# Patient Record
Sex: MALE | Race: ASIAN | NOT HISPANIC OR LATINO
[De-identification: names, ages, dates, MRNs, and addresses within clinical notes are randomized per-mention and may not be internally consistent; named-entity substitution may affect disease eponyms.]

---

## 2024-02-21 PROBLEM — Z86.39 HISTORY OF HYPERLIPIDEMIA: Status: RESOLVED | Noted: 2024-02-21 | Resolved: 2024-02-21

## 2024-02-21 PROBLEM — Z87.891 FORMER SMOKER: Status: ACTIVE | Noted: 2024-02-21

## 2024-02-21 PROBLEM — Z00.00 ENCOUNTER FOR PREVENTIVE HEALTH EXAMINATION: Status: ACTIVE | Noted: 2024-02-21

## 2024-02-21 PROBLEM — I71.20 ANEURYSM, AORTA, THORACIC: Status: RESOLVED | Noted: 2024-02-21 | Resolved: 2024-02-21

## 2024-02-21 PROBLEM — Z86.79 HISTORY OF HYPERTENSION: Status: RESOLVED | Noted: 2024-02-21 | Resolved: 2024-02-21

## 2024-02-21 RX ORDER — ASPIRIN ENTERIC COATED TABLETS 81 MG 81 MG/1
81 TABLET, DELAYED RELEASE ORAL
Refills: 0 | Status: ACTIVE | COMMUNITY

## 2024-02-21 RX ORDER — PANTOPRAZOLE 40 MG/1
40 TABLET, DELAYED RELEASE ORAL
Refills: 0 | Status: ACTIVE | COMMUNITY

## 2024-02-21 RX ORDER — SENNOSIDES 8.6 MG/1
8.6 TABLET, COATED ORAL
Refills: 0 | Status: ACTIVE | COMMUNITY

## 2024-02-21 RX ORDER — ROSUVASTATIN CALCIUM 40 MG/1
40 TABLET, FILM COATED ORAL
Refills: 0 | Status: ACTIVE | COMMUNITY

## 2024-02-21 RX ORDER — METOPROLOL SUCCINATE 25 MG/1
25 TABLET, EXTENDED RELEASE ORAL
Refills: 0 | Status: ACTIVE | COMMUNITY

## 2024-02-21 RX ORDER — OXYCODONE 5 MG/1
5 TABLET ORAL
Refills: 0 | Status: ACTIVE | COMMUNITY

## 2024-02-21 RX ORDER — AMLODIPINE BESYLATE 10 MG/1
10 TABLET ORAL
Refills: 0 | Status: ACTIVE | COMMUNITY

## 2024-02-23 ENCOUNTER — APPOINTMENT (OUTPATIENT)
Dept: THORACIC SURGERY | Facility: CLINIC | Age: 69
End: 2024-02-23
Payer: MEDICARE

## 2024-02-23 DIAGNOSIS — Z86.39 PERSONAL HISTORY OF OTHER ENDOCRINE, NUTRITIONAL AND METABOLIC DISEASE: ICD-10-CM

## 2024-02-23 DIAGNOSIS — I71.20 THORACIC AORTIC ANEURYSM, WITHOUT RUPTURE, UNSPECIFIED: ICD-10-CM

## 2024-02-23 DIAGNOSIS — Z87.891 PERSONAL HISTORY OF NICOTINE DEPENDENCE: ICD-10-CM

## 2024-02-23 DIAGNOSIS — Z86.79 PERSONAL HISTORY OF OTHER DISEASES OF THE CIRCULATORY SYSTEM: ICD-10-CM

## 2024-02-23 PROCEDURE — 99204 OFFICE O/P NEW MOD 45 MIN: CPT

## 2024-02-23 NOTE — PHYSICAL EXAM
[General Appearance - Alert] : alert [Fully active, able to carry on all pre-disease performance without restriction] : Status 0 - Fully active, able to carry on all pre-disease performance without restriction [General Appearance - In No Acute Distress] : in no acute distress [] : no respiratory distress [Auscultation Breath Sounds / Voice Sounds] : lungs were clear to auscultation bilaterally [Chest Visual Inspection Thoracic Asymmetry] : no chest asymmetry [Examination Of The Chest] : the chest was normal in appearance [Diminished Respiratory Excursion] : normal chest expansion [Nail Clubbing] : no clubbing  or cyanosis of the fingernails [Abnormal Walk] : normal gait [Musculoskeletal - Swelling] : no joint swelling seen [Motor Tone] : muscle strength and tone were normal [Oriented To Time, Place, And Person] : oriented to person, place, and time [Impaired Insight] : insight and judgment were intact [Affect] : the affect was normal

## 2024-02-26 VITALS
OXYGEN SATURATION: 96 % | DIASTOLIC BLOOD PRESSURE: 60 MMHG | BODY MASS INDEX: 24.86 KG/M2 | HEIGHT: 68 IN | RESPIRATION RATE: 16 BRPM | HEART RATE: 73 BPM | TEMPERATURE: 97.5 F | WEIGHT: 164 LBS | SYSTOLIC BLOOD PRESSURE: 112 MMHG

## 2024-02-26 NOTE — SURGICAL HISTORY
[TextEntry] : Repair of ruptured thoracic aorta and AVR 10/30/2020 Thoracoabdominal aortic aneurysm repair on 07/18/2023, due to chronic thoracoabdominal aortic aneurysm

## 2024-02-26 NOTE — DATA REVIEWED
[FreeTextEntry1] : CT CAP on 04/15/2023: -Right upper lobe: 0.2 cm nodule medially is stable.  -Left lower lobe: Traction bronchiectasis with associated reticular opacities posterior medially persist. Scant opacities adherent to the walls of the trachea may represent focal mucous retention. -No mediastinal or hilar adenopathy, endobronchial lesions, or pleural effusions are noted. Sub-centimeter lymph nodes in the mediastinum and radha are stable.  CT CAP on 08/16/2022:  -There is no focal pulmonary consolidation. No suspicious pulmonary nodule or mass is identified.

## 2024-02-26 NOTE — HISTORY OF PRESENT ILLNESS
[FreeTextEntry1] :  ROB ORR is a 69-year-old M, former smoker (smoked for 35-40 years, 1PPD, quit in 2023) with PMHx of HLD, ruptured thoracic aortic aneurysm, HTN, chronic Afib, ischemia and infarction of kidney, who is referred by Dr. Je Dowell for an initial evaluation of right lung base nodule, which was found during the workup for thoracic aorta aneurysm.   CT CAP on 02/26/24, revealed a new 0.9 cm right lung base nodule.

## 2024-02-26 NOTE — SOCIAL HISTORY
[TextEntry] : COVID history: vaccinated.  TB history: denied.  Denies major psychiatric history. Occupation: Retired-  Patient lives with family.

## 2024-02-26 NOTE — CONSULT LETTER
[FreeTextEntry3] : Mike Hooker MD Professor, Cardiovascular & Thoracic Surgery [employer] [employer] Director of the [facility]  Director of Thoracic Surgery, [address] [place] [id number] Phone: [phone] Fax: [phone]

## 2024-02-26 NOTE — ASSESSMENT
[FreeTextEntry1] : ROB ORR is a 69-year-old M, former smoker, with PMHx of HLD, ruptured thoracic aortic aneurysm, HTN, chronic Afib, who is referred by Dr. Je Dowell for an initial evaluation of right lung base nodule, which was found during the workup for thoracic aorta aneurysm.   Patient reports he feels well in general. Admits to shortness of breath on exertion, Reports no chest pain, hoarseness, sputum production, fever, chills, unintentional weight loss, or hemoptysis.   I have independently reviewed the medical records and imaging at the time of this office consultation, CT CAP on 02/26/24, revealed a new 0.9 cm right lung base nodule. Explained to the patient that based on the imaging, the etiology is unclear. We will need a full imaging of chest for better evaluation. Will plan for a CT chest no contrast as well as PFT's to measuring underlying lung functioning. Patient agreed with the plan.  Plan: 1.CT chest no contrast. 2.PFTs 3. Follow up after testing.  IKiah RN, am scribing for and the presence of Dr. ASHLEY AVALOS the following sections: history of present illness, past medical/family/surgical/family/social history, review of systems, vital signs, physical exam, and disposition.  ASHLEY MONTANEZ, personally performed the services described in the documentation, reviewed the documentation recorded by the scribe in my presence and it accurately and completely records my words and actions.

## 2024-03-05 ENCOUNTER — OUTPATIENT (OUTPATIENT)
Dept: OUTPATIENT SERVICES | Facility: HOSPITAL | Age: 69
LOS: 1 days | End: 2024-03-05
Payer: MEDICARE

## 2024-03-05 DIAGNOSIS — R91.1 SOLITARY PULMONARY NODULE: ICD-10-CM

## 2024-03-05 PROCEDURE — 94729 DIFFUSING CAPACITY: CPT

## 2024-03-05 PROCEDURE — 94010 BREATHING CAPACITY TEST: CPT | Mod: 26

## 2024-03-05 PROCEDURE — 94726 PLETHYSMOGRAPHY LUNG VOLUMES: CPT | Mod: 26

## 2024-03-05 PROCEDURE — 94760 N-INVAS EAR/PLS OXIMETRY 1: CPT

## 2024-03-05 PROCEDURE — 94060 EVALUATION OF WHEEZING: CPT

## 2024-03-05 PROCEDURE — 94729 DIFFUSING CAPACITY: CPT | Mod: 26

## 2024-03-05 PROCEDURE — 94726 PLETHYSMOGRAPHY LUNG VOLUMES: CPT

## 2024-03-05 RX ORDER — ALBUTEROL 90 UG/1
2 AEROSOL, METERED ORAL ONCE
Refills: 0 | Status: DISCONTINUED | OUTPATIENT
Start: 2024-03-05 | End: 2024-03-19

## 2024-03-08 ENCOUNTER — APPOINTMENT (OUTPATIENT)
Dept: THORACIC SURGERY | Facility: CLINIC | Age: 69
End: 2024-03-08
Payer: MEDICARE

## 2024-03-08 VITALS
TEMPERATURE: 96.7 F | OXYGEN SATURATION: 97 % | HEART RATE: 72 BPM | RESPIRATION RATE: 17 BRPM | SYSTOLIC BLOOD PRESSURE: 113 MMHG | BODY MASS INDEX: 21.67 KG/M2 | DIASTOLIC BLOOD PRESSURE: 55 MMHG | HEIGHT: 68 IN | WEIGHT: 143 LBS

## 2024-03-08 DIAGNOSIS — R93.89 ABNORMAL FINDINGS ON DIAGNOSTIC IMAGING OF OTHER SPECIFIED BODY STRUCTURES: ICD-10-CM

## 2024-03-08 DIAGNOSIS — N28.0 ISCHEMIA AND INFARCTION OF KIDNEY: ICD-10-CM

## 2024-03-08 DIAGNOSIS — Z86.79 PERSONAL HISTORY OF OTHER DISEASES OF THE CIRCULATORY SYSTEM: ICD-10-CM

## 2024-03-08 DIAGNOSIS — R91.1 SOLITARY PULMONARY NODULE: ICD-10-CM

## 2024-03-08 PROCEDURE — 99213 OFFICE O/P EST LOW 20 MIN: CPT

## 2024-03-11 PROBLEM — Z86.79 HISTORY OF ATRIAL FIBRILLATION: Status: RESOLVED | Noted: 2024-03-11 | Resolved: 2024-03-11

## 2024-03-11 PROBLEM — R93.89 ABNORMAL CHEST CT: Status: ACTIVE | Noted: 2024-03-11

## 2024-03-11 PROBLEM — N28.0: Status: RESOLVED | Noted: 2024-03-11 | Resolved: 2024-03-11

## 2024-03-11 NOTE — HISTORY OF PRESENT ILLNESS
[FreeTextEntry1] : Gurdeep Donnelly is a 69-year-old M, former smoker (40 pack-years, quit in 2023) with a PMHx of HLD, ruptured thoracic aortic aneurysm- s/p repair, HTN, chronic A.FIB, and ischemia and infarction of the kidney. He was referred to our practice by Dr. Je Dowell for an evaluation of a right lung base nodule which was incidentally found during the workup for thoracic aorta aneurysm.   Patient had a CT CAP on 02/26/24 which revealed a new 0.9 cm right lung base nodule. He presents today for a follow up visit after completion of CT chest scan and PFTs. Reports are as follows:  CT Thorax w/o contrast performed at  on 03/06/2024: 1. The 9mm nodule in the right lung base noted on the 1/26/24 CT urogram has decreased in size and is now 3mm w/ adjacent scarring  2. New patchy GGO in the right lower lobe and posterior left upper lobe.  New 2mm nodule in the lingula. This may be infectious/inflammatory in etiology. A follow-up non-contrast chest CT is recommended in 6 months to assess for interval change.   3. Status post ascending thoracic aortic aneurysm repair and aortic valve replacement.   PFTs done on 03/05/24 showed FEV1 =2.46, 86% of predicted value, FVC = 3.37, 91% of predicted value, PEF=6.63, 86% of predicted value and DLCO =20.06, 84% of predicted value.   He reports feeling well. He reports SOBE that has not worsened. He denies cough, chest pain, fever, night sweats, or unintentional weight loss.

## 2024-03-11 NOTE — END OF VISIT
[FreeTextEntry3] : I, Dr. Mike Hooker MD, personally performed the evaluation and management (E/M) services for this established patient who presents today with (a) new problem(s)/exacerbation of (an) existing condition(s). That E/M includes conducting the clinically appropriate interval history &/or exam, assessing all new/exacerbated conditions, and establishing a new plan of care. Today, my GINA, Lavonne Arriaga NP, was here to observe &/or participate in the visit & follow plan of care established by me.

## 2024-03-11 NOTE — CONSULT LETTER
[Dear  ___] : Dear  [unfilled], [Please see my note below.] : Please see my note below. [Consult Letter:] : I had the pleasure of evaluating your patient, [unfilled]. [Consult Closing:] : Thank you very much for allowing me to participate in the care of this patient.  If you have any questions, please do not hesitate to contact me. [Sincerely,] : Sincerely, [FreeTextEntry3] : Mike Hooker MD Professor, Cardiovascular & Thoracic Surgery [facility] [employer] Director of the [facility]  Director of Thoracic Surgery, [address] [place] [address] Phone: [phone] Fax: [phone]

## 2024-03-11 NOTE — ASSESSMENT
[FreeTextEntry1] : Mr. Donnelly is a 69-year-old M, former smoker (40 pack-years, quit in 2023) with a PMHx of HLD, ruptured thoracic aortic aneurysm- s/p repair, HTN, chronic A.FIB, and ischemia and infarction of the kidney. He was referred to our practice by Dr. Je Dowell for an evaluation of a right lung base nodule which was incidentally found during the workup for thoracic aorta aneurysm.   Patient had a CT CAP on 02/26/24 which revealed a new 0.9 cm right lung base nodule. He presents today for a follow up visit after completion of CT chest scan and PFTs.   I have independently reviewed the medical records and imaging at the time of this office consultation, and discussed the following interpretations with the patient:  CT Thorax w/o contrast performed at  on 03/06/2024 noted that the 9mm nodule in the right lung base noted on the 1/26/24 CT urogram has decreased in size and is now 3mm w/ adjacent scarring. There is a new patchy GGO in the right lower lobe and posterior left upper lobe.  There is also a new 2mm nodule in the lingula. This may be infectious/inflammatory in etiology. A follow-up non-contrast chest CT is recommended in 6 months to assess for interval change.  There is evidence of an ascending thoracic aortic aneurysm repair and aortic valve replacement.   PFTs done on 03/05/24 showed FEV1 of 2.46 or 86% of predicted value, FVC of 3.37 or 91% of predicted value, PEF of 6.63 or 86% of predicted value and DLCO of 20.06, 84% of predicted value.   He reports feeling well. He reports SOBE that has not worsened. He denies cough, chest pain, fever, night sweats, or unintentional weight loss.   Given the findings on recent CT and his history of smoking we will continue to surveil the areas mentioned above with another CT chest in 6 months. Should he experience any acute respiratory symptoms or worsening dyspnea he is aware to contact the practice sooner.   All questions and concerns were addressed with the patient at the time of visit, who expressed understanding.  PLAN: CT chest non-contrast in 6 months RTC to review in 6 months with NP

## 2024-03-11 NOTE — PHYSICAL EXAM
[Sclera] : the sclera and conjunctiva were normal [Extraocular Movements] : extraocular movements were intact [Neck Appearance] : the appearance of the neck was normal [Neck Cervical Mass (___cm)] : no neck mass was observed [Jugular Venous Distention Increased] : there was no jugular-venous distention [Respiration, Rhythm And Depth] : normal respiratory rhythm and effort [Exaggerated Use Of Accessory Muscles For Inspiration] : no accessory muscle use [Apical Impulse] : the apical impulse was normal [Heart Rate And Rhythm] : heart rate was normal and rhythm regular [Heart Sounds] : normal S1 and S2 [Examination Of The Chest] : the chest was normal in appearance [Chest Visual Inspection Thoracic Asymmetry] : no chest asymmetry [Abdomen Soft] : soft [No CVA Tenderness] : no ~M costovertebral angle tenderness [Abnormal Walk] : normal gait [Nail Clubbing] : no clubbing  or cyanosis of the fingernails [Skin Color & Pigmentation] : normal skin color and pigmentation [Sensation] : the sensory exam was normal to light touch and pinprick [] : no rash [Motor Exam] : the motor exam was normal [Oriented To Time, Place, And Person] : oriented to person, place, and time [Affect] : the affect was normal [Mood] : the mood was normal

## 2024-08-21 ENCOUNTER — NON-APPOINTMENT (OUTPATIENT)
Age: 69
End: 2024-08-21